# Patient Record
Sex: FEMALE | Race: BLACK OR AFRICAN AMERICAN | Employment: OTHER | URBAN - METROPOLITAN AREA
[De-identification: names, ages, dates, MRNs, and addresses within clinical notes are randomized per-mention and may not be internally consistent; named-entity substitution may affect disease eponyms.]

---

## 2018-12-19 ENCOUNTER — HOSPITAL ENCOUNTER (EMERGENCY)
Age: 48
Discharge: HOME | End: 2018-12-19
Attending: EMERGENCY MEDICINE
Payer: MEDICARE

## 2018-12-19 VITALS
DIASTOLIC BLOOD PRESSURE: 80 MMHG | BODY MASS INDEX: 27.31 KG/M2 | WEIGHT: 160 LBS | HEIGHT: 64 IN | SYSTOLIC BLOOD PRESSURE: 125 MMHG | HEART RATE: 76 BPM | RESPIRATION RATE: 14 BRPM | OXYGEN SATURATION: 98 % | TEMPERATURE: 98 F

## 2018-12-19 DIAGNOSIS — M25.562 CHRONIC PAIN OF BOTH KNEES: Primary | ICD-10-CM

## 2018-12-19 DIAGNOSIS — M25.561 CHRONIC PAIN OF BOTH KNEES: Primary | ICD-10-CM

## 2018-12-19 DIAGNOSIS — G89.29 CHRONIC PAIN OF BOTH KNEES: Primary | ICD-10-CM

## 2018-12-19 PROCEDURE — 99283 EMERGENCY DEPT VISIT LOW MDM: CPT

## 2018-12-19 ASSESSMENT — PAIN SCALES - GENERAL
PAINLEVEL_OUTOF10: 8
PAINLEVEL_OUTOF10: 8

## 2018-12-19 ASSESSMENT — PAIN DESCRIPTION - PAIN TYPE: TYPE: CHRONIC PAIN

## 2018-12-19 ASSESSMENT — PAIN DESCRIPTION - LOCATION: LOCATION: KNEE

## 2019-04-15 ENCOUNTER — OFFICE VISIT (OUTPATIENT)
Dept: ORTHOPEDIC SURGERY | Age: 49
End: 2019-04-15
Payer: MEDICARE

## 2019-04-15 VITALS
HEIGHT: 64 IN | WEIGHT: 160.05 LBS | HEART RATE: 68 BPM | BODY MASS INDEX: 27.33 KG/M2 | SYSTOLIC BLOOD PRESSURE: 128 MMHG | DIASTOLIC BLOOD PRESSURE: 68 MMHG

## 2019-04-15 DIAGNOSIS — M79.674 PAIN OF TOE OF RIGHT FOOT: ICD-10-CM

## 2019-04-15 DIAGNOSIS — M79.671 FOOT PAIN, RIGHT: Primary | ICD-10-CM

## 2019-04-15 DIAGNOSIS — S93.523A: ICD-10-CM

## 2019-04-15 PROCEDURE — L4361 PNEUMA/VAC WALK BOOT PRE OTS: HCPCS | Performed by: PODIATRIST

## 2019-04-15 PROCEDURE — 3017F COLORECTAL CA SCREEN DOC REV: CPT | Performed by: PODIATRIST

## 2019-04-15 PROCEDURE — 1036F TOBACCO NON-USER: CPT | Performed by: PODIATRIST

## 2019-04-15 PROCEDURE — G8427 DOCREV CUR MEDS BY ELIG CLIN: HCPCS | Performed by: PODIATRIST

## 2019-04-15 PROCEDURE — G8419 CALC BMI OUT NRM PARAM NOF/U: HCPCS | Performed by: PODIATRIST

## 2019-04-15 PROCEDURE — 99203 OFFICE O/P NEW LOW 30 MIN: CPT | Performed by: PODIATRIST

## 2019-04-15 NOTE — PROGRESS NOTES
HISTORY OF PRESENT ILLNESS: This is an initial visit for a 79-year-old female with a chief complaint of right foot pain. The patient twisted the  foot 7 days ago. She tripped over a parking stall block. Pain is present with weightbearing  and twisting motions of the foot. There is very little to no pain at the ankle. The pain is best relieved with rest, ice, and  elevation. FAMILY HISTORY: Documented in chart. SOCIAL HISTORY:  Documented in chart. REVIEW OF SYSTEMS: S/P MIS bunion surgery, otherwise  The patient denies any fever, chills, or night sweats. The patient also denies developing any type of rash. The patient denies any problems with cardiovascular, pulmonary, gastrointestinal, neurologic, urologic, genitourinary, psychiatric, dermatologic, and HEENT systems. PHYSICAL EXAMINATION: The area of greatest palpable tenderness is at the  He'll aspect of the right 1st MTP. There is while edema of the right forefoot. No open lesions or fracture blisters are present. She has palpable pedal pulses bilateral.  Sensation is grossly intact bilateral. There is no pain over the deltoid  ligament. There is no pain over the Achilles tendon and this is palpated to be  intact. Kelseys test is negative for a complete rupture of the Achilles  tendon. She has pain with any attempted range of motion at the 1st MTP. An anterior drawer test at the ankle is negative for any gross instability. The patient is able to dorsiflex and plantarflex the foot, as well as  nika and invert independently. RADIOGRAPHS: Three weightbearing x-ray views of the right foot were evaluated. No acute fractures or dislocations are noted. 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 This is also noted in her 2007 right foot film. ASSESSMENT: Right 1st MTP Sprain with foot pain. PLAN: The patient was educated on the pathology and its treatment options.      A high tide walker was applied to the patients right lower extremity. The patient is allowed to bear weight as  tolerated. Range of motion exercises were prescribed to the patient. Rest, ice,  and elevation are to be used to relieve pain. Overall activity is to be decreased  in the short-term. I will see her back if she continues having pain in about 4 weeks. Procedures    Airselect Tall Pneumatic Walking Boot     Patient was prescribed a Bevelyn [employer] Tall Walking Boot. The right foot will require stabilization / immobilization from this semi-rigid / rigid orthosis to improve their function. The orthosis will assist in protecting the affected area, provide functional support and facilitate healing. Patient was instructed to progress ambulation weight bearing as tolerated in the device. The patient was educated and fit by a healthcare professional with expert knowledge and specialization in brace application while under the direct supervision of the physician. Verbal and written instructions for the use of and application of this item were provided. They were instructed to contact the office immediately should the brace result in increased pain, decreased sensation, increased swelling or worsening of the condition.

## 2020-01-09 ENCOUNTER — HOSPITAL ENCOUNTER (EMERGENCY)
Age: 50
Discharge: HOME | End: 2020-01-09
Attending: EMERGENCY MEDICINE
Payer: MEDICARE

## 2020-01-09 VITALS
WEIGHT: 150 LBS | OXYGEN SATURATION: 98 % | HEIGHT: 64 IN | RESPIRATION RATE: 16 BRPM | BODY MASS INDEX: 25.61 KG/M2 | TEMPERATURE: 98.3 F | SYSTOLIC BLOOD PRESSURE: 109 MMHG | DIASTOLIC BLOOD PRESSURE: 73 MMHG | HEART RATE: 58 BPM

## 2020-01-09 LAB
ALBUMIN SERPL-MCNC: 4.4 G/DL (ref 3.4–5)
ALP BLD-CCNC: 89 U/L (ref 40–129)
ALT SERPL-CCNC: 15 U/L (ref 10–40)
ANION GAP SERPL CALCULATED.3IONS-SCNC: 11 MMOL/L (ref 3–16)
AST SERPL-CCNC: 28 U/L (ref 15–37)
BASOPHILS ABSOLUTE: 0 K/UL (ref 0–0.2)
BASOPHILS RELATIVE PERCENT: 0.7 %
BILIRUB SERPL-MCNC: <0.2 MG/DL (ref 0–1)
BILIRUBIN DIRECT: <0.2 MG/DL (ref 0–0.3)
BILIRUBIN URINE: NEGATIVE
BILIRUBIN, INDIRECT: NORMAL MG/DL (ref 0–1)
BLOOD, URINE: NEGATIVE
BUN BLDV-MCNC: 13 MG/DL (ref 7–20)
CALCIUM SERPL-MCNC: 9.4 MG/DL (ref 8.3–10.6)
CHLORIDE BLD-SCNC: 103 MMOL/L (ref 99–110)
CLARITY: CLEAR
CO2: 28 MMOL/L (ref 21–32)
COLOR: YELLOW
CREAT SERPL-MCNC: 1 MG/DL (ref 0.6–1.1)
EOSINOPHILS ABSOLUTE: 0.3 K/UL (ref 0–0.6)
EOSINOPHILS RELATIVE PERCENT: 5.5 %
EPITHELIAL CELLS, UA: ABNORMAL /HPF
GFR AFRICAN AMERICAN: >60
GFR NON-AFRICAN AMERICAN: 58
GLUCOSE BLD-MCNC: 77 MG/DL (ref 70–99)
GLUCOSE URINE: NEGATIVE MG/DL
HCT VFR BLD CALC: 37.9 % (ref 36–48)
HEMOGLOBIN: 12.1 G/DL (ref 12–16)
KETONES, URINE: NEGATIVE MG/DL
LEUKOCYTE ESTERASE, URINE: ABNORMAL
LIPASE: 41 U/L (ref 13–60)
LYMPHOCYTES ABSOLUTE: 1.5 K/UL (ref 1–5.1)
LYMPHOCYTES RELATIVE PERCENT: 26.6 %
MCH RBC QN AUTO: 27.5 PG (ref 26–34)
MCHC RBC AUTO-ENTMCNC: 31.9 G/DL (ref 31–36)
MCV RBC AUTO: 86 FL (ref 80–100)
MICROSCOPIC EXAMINATION: YES
MONOCYTES ABSOLUTE: 0.6 K/UL (ref 0–1.3)
MONOCYTES RELATIVE PERCENT: 10.4 %
NEUTROPHILS ABSOLUTE: 3.1 K/UL (ref 1.7–7.7)
NEUTROPHILS RELATIVE PERCENT: 56.8 %
NITRITE, URINE: NEGATIVE
PDW BLD-RTO: 14 % (ref 12.4–15.4)
PH UA: 5.5 (ref 5–8)
PLATELET # BLD: 366 K/UL (ref 135–450)
PMV BLD AUTO: 7 FL (ref 5–10.5)
POTASSIUM SERPL-SCNC: 4.1 MMOL/L (ref 3.5–5.1)
PROTEIN UA: NEGATIVE MG/DL
RBC # BLD: 4.41 M/UL (ref 4–5.2)
RBC UA: ABNORMAL /HPF (ref 0–2)
SODIUM BLD-SCNC: 142 MMOL/L (ref 136–145)
SPECIFIC GRAVITY UA: 1.02 (ref 1–1.03)
TOTAL PROTEIN: 7.2 G/DL (ref 6.4–8.2)
URINE TYPE: ABNORMAL
UROBILINOGEN, URINE: 0.2 E.U./DL
WBC # BLD: 5.5 K/UL (ref 4–11)
WBC UA: ABNORMAL /HPF (ref 0–5)

## 2020-01-09 PROCEDURE — 96374 THER/PROPH/DIAG INJ IV PUSH: CPT

## 2020-01-09 PROCEDURE — 96375 TX/PRO/DX INJ NEW DRUG ADDON: CPT

## 2020-01-09 PROCEDURE — 6360000002 HC RX W HCPCS: Performed by: EMERGENCY MEDICINE

## 2020-01-09 PROCEDURE — 80048 BASIC METABOLIC PNL TOTAL CA: CPT

## 2020-01-09 PROCEDURE — 85025 COMPLETE CBC W/AUTO DIFF WBC: CPT

## 2020-01-09 PROCEDURE — 81001 URINALYSIS AUTO W/SCOPE: CPT

## 2020-01-09 PROCEDURE — 99284 EMERGENCY DEPT VISIT MOD MDM: CPT

## 2020-01-09 PROCEDURE — 83690 ASSAY OF LIPASE: CPT

## 2020-01-09 PROCEDURE — 80076 HEPATIC FUNCTION PANEL: CPT

## 2020-01-09 RX ORDER — ONDANSETRON 4 MG/1
4 TABLET, ORALLY DISINTEGRATING ORAL EVERY 8 HOURS PRN
Qty: 20 TABLET | Refills: 0 | Status: SHIPPED | OUTPATIENT
Start: 2020-01-09 | End: 2020-08-29

## 2020-01-09 RX ORDER — KETOROLAC TROMETHAMINE 30 MG/ML
15 INJECTION, SOLUTION INTRAMUSCULAR; INTRAVENOUS ONCE
Status: COMPLETED | OUTPATIENT
Start: 2020-01-09 | End: 2020-01-09

## 2020-01-09 RX ORDER — ONDANSETRON 2 MG/ML
4 INJECTION INTRAMUSCULAR; INTRAVENOUS ONCE
Status: COMPLETED | OUTPATIENT
Start: 2020-01-09 | End: 2020-01-09

## 2020-01-09 RX ADMIN — ONDANSETRON 4 MG: 2 INJECTION INTRAMUSCULAR; INTRAVENOUS at 18:34

## 2020-01-09 RX ADMIN — KETOROLAC TROMETHAMINE 15 MG: 30 INJECTION, SOLUTION INTRAMUSCULAR at 18:34

## 2020-01-09 ASSESSMENT — PAIN DESCRIPTION - LOCATION: LOCATION: ABDOMEN

## 2020-01-09 ASSESSMENT — PAIN SCALES - GENERAL: PAINLEVEL_OUTOF10: 8

## 2020-01-09 ASSESSMENT — PAIN DESCRIPTION - ORIENTATION: ORIENTATION: LOWER

## 2020-01-09 ASSESSMENT — PAIN DESCRIPTION - FREQUENCY: FREQUENCY: CONTINUOUS

## 2020-01-09 ASSESSMENT — PAIN DESCRIPTION - DESCRIPTORS: DESCRIPTORS: CRAMPING

## 2020-01-09 ASSESSMENT — PAIN DESCRIPTION - PAIN TYPE: TYPE: ACUTE PAIN
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
## 2020-04-28 ENCOUNTER — OFFICE VISIT (OUTPATIENT)
Dept: PRIMARY CARE CLINIC | Age: 50
End: 2020-04-28
Payer: MEDICARE

## 2020-04-28 VITALS — HEART RATE: 63 BPM | TEMPERATURE: 98.2 F | OXYGEN SATURATION: 98 %

## 2020-04-28 PROCEDURE — 99212 OFFICE O/P EST SF 10 MIN: CPT | Performed by: NURSE PRACTITIONER

## 2020-04-28 PROCEDURE — G8419 CALC BMI OUT NRM PARAM NOF/U: HCPCS | Performed by: NURSE PRACTITIONER

## 2020-04-28 PROCEDURE — 3017F COLORECTAL CA SCREEN DOC REV: CPT | Performed by: NURSE PRACTITIONER

## 2020-04-28 PROCEDURE — 1036F TOBACCO NON-USER: CPT | Performed by: NURSE PRACTITIONER

## 2020-04-28 PROCEDURE — G8428 CUR MEDS NOT DOCUMENT: HCPCS | Performed by: NURSE PRACTITIONER

## 2020-04-28 NOTE — PATIENT INSTRUCTIONS
Advance Care Planning  People with COVID-19 may have no symptoms, mild symptoms, such as fever, cough, and shortness of breath or they may have more severe illness, developing severe and fatal pneumonia. As a result, Advance Care Planning with attention to naming a health care decision maker (someone you trust to make healthcare decisions for you if you could not speak for yourself) and sharing other health care preferences is important BEFORE a possible health crisis. Please contact your Primary Care Provider to discuss Advance Care Planning. Preventing the Spread of Coronavirus Disease 2019 in Homes and Residential Communities  For the most recent information go to [email or web address]    Prevention steps for People with confirmed or suspected COVID-19 (including persons under investigation) who do not need to be hospitalized  and   People with confirmed COVID-19 who were hospitalized and determined to be medically stable to go home    Your healthcare provider and public health staff will evaluate whether you can be cared for at home. If it is determined that you do not need to be hospitalized and can be isolated at home, you will be monitored by staff from your local or state health department. You should follow the prevention steps below until a healthcare provider or local or state health department says you can return to your normal activities. Stay home except to get medical care  People who are mildly ill with COVID-19 are able to isolate at home during their illness. You should restrict activities outside your home, except for getting medical care. Do not go to work, school, or public areas. Avoid using public transportation, ride-sharing, or taxis. Separate yourself from other people and animals in your home  People: As much as possible, you should stay in a specific room and away from other people in your home.  Also, you should use a separate before eating or preparing food. If soap and water are not readily available, use an alcohol-based hand  with at least 60% alcohol, covering all surfaces of your hands and rubbing them together until they feel dry. Soap and water are the best option if hands are visibly dirty. Avoid touching your eyes, nose, and mouth with unwashed hands. Avoid sharing personal household items  You should not share dishes, drinking glasses, cups, eating utensils, towels, or bedding with other people or pets in your home. After using these items, they should be washed thoroughly with soap and water. Clean all high-touch surfaces everyday  High touch surfaces include counters, tabletops, doorknobs, bathroom fixtures, toilets, phones, keyboards, tablets, and bedside tables. Also, clean any surfaces that may have blood, stool, or body fluids on them. Use a household cleaning spray or wipe, according to the label instructions. Labels contain instructions for safe and effective use of the cleaning product including precautions you should take when applying the product, such as wearing gloves and making sure you have good ventilation during use of the product. Monitor your symptoms  Seek prompt medical attention if your illness is worsening (e.g., difficulty breathing). Before seeking care, call your healthcare provider and tell them that you have, or are being evaluated for, COVID-19. Put on a facemask before you enter the facility. These steps will help the healthcare providers office to keep other people in the office or waiting room from getting infected or exposed. Ask your healthcare provider to call the local or state health department. Persons who are placed under active monitoring or facilitated self-monitoring should follow instructions provided by their local health department or occupational health professionals, as appropriate. When working with your local health department check their available hours.   If you

## 2020-04-30 ENCOUNTER — TELEPHONE (OUTPATIENT)
Dept: PRIMARY CARE CLINIC | Age: 50
End: 2020-04-30

## 2020-04-30 NOTE — TELEPHONE ENCOUNTER
Called patient to follow up from flu clinic visit and left voicemail for loop, advising them to reach out to pcp for follow up visit 7-14 after being seen here and if they dont have pcp to reach out to the primary care hot line at [phone]

## 2020-05-01 LAB
SARS-COV-2: NOT DETECTED
SOURCE: NORMAL

## 2020-07-01 ENCOUNTER — NURSE ONLY (OUTPATIENT)
Dept: PRIMARY CARE CLINIC | Age: 50
End: 2020-07-01
Payer: MEDICARE

## 2020-07-01 PROCEDURE — 99211 OFF/OP EST MAY X REQ PHY/QHP: CPT | Performed by: NURSE PRACTITIONER

## 2020-07-01 NOTE — PROGRESS NOTES
Shen Barrera received a viral test for COVID-19. They were educated on isolation and quarantine as appropriate. For any symptoms, they were directed to seek care from their PCP, given contact information to establish with a doctor, directed to an urgent care or the emergency room.

## 2020-07-04 LAB
SARS-COV-2: NOT DETECTED
SOURCE: NORMAL

## 2020-08-29 ENCOUNTER — APPOINTMENT (OUTPATIENT)
Dept: GENERAL RADIOLOGY | Age: 50
End: 2020-08-29
Payer: MEDICARE

## 2020-08-29 ENCOUNTER — HOSPITAL ENCOUNTER (EMERGENCY)
Age: 50
Discharge: HOME | End: 2020-08-29
Attending: STUDENT IN AN ORGANIZED HEALTH CARE EDUCATION/TRAINING PROGRAM
Payer: MEDICARE

## 2020-08-29 VITALS
OXYGEN SATURATION: 97 % | DIASTOLIC BLOOD PRESSURE: 76 MMHG | SYSTOLIC BLOOD PRESSURE: 130 MMHG | TEMPERATURE: 98.9 F | HEART RATE: 72 BPM | RESPIRATION RATE: 18 BRPM

## 2020-08-29 PROCEDURE — 73590 X-RAY EXAM OF LOWER LEG: CPT

## 2020-08-29 PROCEDURE — 6370000000 HC RX 637 (ALT 250 FOR IP): Performed by: STUDENT IN AN ORGANIZED HEALTH CARE EDUCATION/TRAINING PROGRAM

## 2020-08-29 PROCEDURE — 73630 X-RAY EXAM OF FOOT: CPT

## 2020-08-29 PROCEDURE — 73610 X-RAY EXAM OF ANKLE: CPT

## 2020-08-29 PROCEDURE — 99283 EMERGENCY DEPT VISIT LOW MDM: CPT

## 2020-08-29 RX ORDER — IBUPROFEN 400 MG/1
800 TABLET ORAL ONCE
Status: COMPLETED | OUTPATIENT
Start: 2020-08-29 | End: 2020-08-29

## 2020-08-29 RX ORDER — HYDROCODONE BITARTRATE AND ACETAMINOPHEN 5; 325 MG/1; MG/1
1 TABLET ORAL ONCE
Status: COMPLETED | OUTPATIENT
Start: 2020-08-29 | End: 2020-08-29

## 2020-08-29 RX ADMIN — HYDROCODONE BITARTRATE AND ACETAMINOPHEN 1 TABLET: 5; 325 TABLET ORAL at 18:01

## 2020-08-29 RX ADMIN — IBUPROFEN 800 MG: 400 TABLET, FILM COATED ORAL at 18:37

## 2020-08-29 ASSESSMENT — PAIN DESCRIPTION - FREQUENCY: FREQUENCY: CONTINUOUS

## 2020-08-29 ASSESSMENT — ENCOUNTER SYMPTOMS
ABDOMINAL PAIN: 0
TROUBLE SWALLOWING: 0
SHORTNESS OF BREATH: 0
VOMITING: 0
DIARRHEA: 0
NAUSEA: 0
SORE THROAT: 0
BLOOD IN STOOL: 0
COUGH: 0

## 2020-08-29 ASSESSMENT — PAIN DESCRIPTION - ORIENTATION: ORIENTATION: LEFT

## 2020-08-29 ASSESSMENT — PAIN DESCRIPTION - ONSET: ONSET: ON-GOING

## 2020-08-29 ASSESSMENT — PAIN DESCRIPTION - DESCRIPTORS: DESCRIPTORS: THROBBING

## 2020-08-29 ASSESSMENT — PAIN DESCRIPTION - LOCATION: LOCATION: FOOT

## 2020-08-29 ASSESSMENT — PAIN SCALES - GENERAL
PAINLEVEL_OUTOF10: 10

## 2020-08-29 NOTE — ED PROVIDER NOTES
ED Attending Attestation Note     Date of evaluation: 8/29/2020    This patient was seen by the resident. I have seen and examined the patient, agree with the workup, evaluation, management and diagnosis. The care plan has been discussed. My assessment reveals a 50-year-old female who presents with left ankle pain after eversion of this ankle while walking. Patient continued to ambulate on left ankle but with pain prompting ED presentation. Patient noted to have pain and tenderness to left lateral malleolus with tenderness of the left fifth metatarsal as well, motor, sensory, and cap refill intact distally x-ray reveals lateral malleoli are fracture without significant displacement. Patient placed in walking boot and provided with ibuprofen for pain relief which she states helped. We will plan for discharge home with outpatient orthopedic surgery follow-up.      Daquan Hale MD  08/29/20 7345

## 2020-08-29 NOTE — ED TRIAGE NOTES
Pt was walking a crossed the yard and stepped into a rock causing her to fall and twist her ankle. Pt states this happened about 3 hours ago. Pt attempted to go to work but was unable to continue walking because of the pain.

## 2020-08-29 NOTE — ED NOTES
Patient prepared for and ready to be discharged. Patient discharged at this time in no acute distress after verbalizing understanding of discharge instructions. Patient left after receiving After Visit Summary instructions.         Carlos Watters RN  08/29/20 [id number]

## 2020-08-29 NOTE — ED PROVIDER NOTES
[address] ENCOUNTER          EM RESIDENT NOTE       Date of evaluation: 8/29/2020    Chief Complaint     Foot Injury (left)      History of Present Illness     Georgi Dawson is a 45 y.o. female who presents with left foot injury. She was distracted and twisted her ankle stepping off a curb. It was an eversion injury. She was able to walk on it but was at work at [employer] and the pain was getting worse. It was also getting more swollen. She took some tylenol with limited improvement. Review of Systems     Review of Systems   Constitutional: Negative for activity change, appetite change and fever. HENT: Negative for congestion, sore throat and trouble swallowing. Eyes: Negative for visual disturbance. Respiratory: Negative for cough and shortness of breath. Cardiovascular: Negative for chest pain. Gastrointestinal: Negative for abdominal pain, blood in stool, diarrhea, nausea and vomiting. Genitourinary: Negative for dysuria, flank pain and hematuria. Musculoskeletal: Positive for joint swelling. Negative for arthralgias and myalgias. Skin: Negative for rash. Neurological: Negative for speech difficulty, weakness, numbness and headaches. Psychiatric/Behavioral: Negative for confusion and sleep disturbance. Past Medical, Surgical, Family, and Social History     She has a past medical history of Depression and Potential exposure to STD. She has a past surgical history that includes Abdomen surgery; fracture surgery; and knee surgery. Her family history is not on file. She reports that she has never smoked. She has never used smokeless tobacco. She reports that she does not drink alcohol or use drugs. Medications     Previous Medications    No medications on file       Allergies     She has No Known Allergies.     Physical Exam     INITIAL VITALS:  , Temp: 98.9 °F (37.2 °C), Pulse: 72, Resp: 18, SpO2: 97 %   Physical Exam  Constitutional: General: She is not in acute distress. Appearance: She is well-developed. HENT:      Head: Normocephalic and atraumatic. Nose: Nose normal.      Mouth/Throat:      Mouth: Mucous membranes are moist.      Pharynx: Oropharynx is clear. Eyes:      Conjunctiva/sclera: Conjunctivae normal.      Pupils: Pupils are equal, round, and reactive to light. Neck:      Musculoskeletal: Normal range of motion. No neck rigidity. Cardiovascular:      Rate and Rhythm: Normal rate and regular rhythm. Heart sounds: Normal heart sounds. Pulmonary:      Effort: Pulmonary effort is normal. No respiratory distress. Breath sounds: Normal breath sounds. Abdominal:      General: Abdomen is flat. There is no distension. Palpations: Abdomen is soft. There is no mass. Tenderness: There is no abdominal tenderness. There is no guarding. Musculoskeletal: Normal range of motion. General: Swelling and tenderness present. No deformity. Comments: Mild swelling along the left lateral malleolus with tenderness to palpation along the left posterior lateral mallelous. No tenderness in the foot. Normal ROM of the ankle with some tenderness. Normal ROM of the knee. No tenderness along the fibula. 2+ DP pulse. Lymphadenopathy:      Cervical: No cervical adenopathy. Skin:     General: Skin is warm. Findings: No rash. Neurological:      General: No focal deficit present. Mental Status: She is alert and oriented to person, place, and time. Mental status is at baseline. Cranial Nerves: No cranial nerve deficit. Sensory: No sensory deficit. Motor: No weakness.       Coordination: Coordination normal.   Psychiatric:         Mood and Affect: Mood normal.         Behavior: Behavior normal.         DiagnosticResults       RADIOLOGY:  XR ANKLE LEFT (MIN 3 VIEWS)   Final Result      Acute, nondisplaced lateral malleolus fracture, Shah A type      XR TIBIA FIBULA LEFT (2 VIEWS) Final Result      No additional fracture      See ankle series report for lateral malleolus fracture      XR FOOT LEFT (MIN 3 VIEWS)    (Results Pending)       LABS:   No results found for this visit on 08/29/20. ED BEDSIDE ULTRASOUND:      RECENT VITALS:   , Temp: 98.9 °F (37.2 °C), Pulse: 72,Resp: 18, SpO2: 97 %     Procedures         ED Course     Nursing Notes, Past Medical Hx, Past Surgical Hx, Social Hx, Allergies, and Family Hx were reviewed. The patient was given the followingmedications:  Orders Placed This Encounter   Medications    HYDROcodone-acetaminophen (NORCO) 5-325 MG per tablet 1 tablet       CONSULTS:  None    MEDICAL DECISION MAKING / ASSESSMENT / Jonatan Florence is a 45 y.o. female who presents with left ankle pain. On presentation she was in no acute distress and hemodynamically stable. X-rays of the left ankle and tib/fib with acute fracture of the lateral malleolus, non displaced. She was placed in walking boot, given crutches, and discharged with ortho follow up. This patient was also evaluated by the attending physician. All care plans werediscussed and agreed upon. Clinical Impression     1.  Closed nondisplaced fracture of lateral malleolus of left fibula, initial encounter        Disposition     PATIENT REFERRED TO:  Agustin Mtz MD  [facility] [address] [phone]            DISCHARGE MEDICATIONS:  New Prescriptions    No medications on file       DISPOSITION Decision To Discharge 08/29/2020 05:49:00 PM       Reina Moran MD  Resident  08/29/20 7562       Reina Moran MD  Resident  08/29/20 1800

## 2020-09-18 ENCOUNTER — HOSPITAL ENCOUNTER (EMERGENCY)
Age: 50
Discharge: HOME | End: 2020-09-18
Payer: MEDICARE

## 2020-09-18 ENCOUNTER — APPOINTMENT (OUTPATIENT)
Dept: GENERAL RADIOLOGY | Age: 50
End: 2020-09-18
Payer: MEDICARE

## 2020-09-18 VITALS
HEIGHT: 64 IN | OXYGEN SATURATION: 100 % | RESPIRATION RATE: 18 BRPM | BODY MASS INDEX: 25.61 KG/M2 | TEMPERATURE: 97.6 F | HEART RATE: 62 BPM | DIASTOLIC BLOOD PRESSURE: 70 MMHG | WEIGHT: 150 LBS | SYSTOLIC BLOOD PRESSURE: 110 MMHG

## 2020-09-18 PROCEDURE — 99283 EMERGENCY DEPT VISIT LOW MDM: CPT

## 2020-09-18 PROCEDURE — 96372 THER/PROPH/DIAG INJ SC/IM: CPT

## 2020-09-18 PROCEDURE — 6360000002 HC RX W HCPCS: Performed by: PHYSICIAN ASSISTANT

## 2020-09-18 PROCEDURE — 73610 X-RAY EXAM OF ANKLE: CPT

## 2020-09-18 RX ORDER — HYDROCODONE BITARTRATE AND ACETAMINOPHEN 5; 325 MG/1; MG/1
1 TABLET ORAL EVERY 6 HOURS PRN
Qty: 10 TABLET | Refills: 0 | Status: SHIPPED | OUTPATIENT
Start: 2020-09-18 | End: 2020-09-21

## 2020-09-18 RX ORDER — IBUPROFEN 600 MG/1
600 TABLET ORAL EVERY 6 HOURS PRN
Qty: 30 TABLET | Refills: 0 | OUTPATIENT
Start: 2020-09-18 | End: 2021-04-16

## 2020-09-18 RX ORDER — KETOROLAC TROMETHAMINE 30 MG/ML
15 INJECTION, SOLUTION INTRAMUSCULAR; INTRAVENOUS ONCE
Status: COMPLETED | OUTPATIENT
Start: 2020-09-18 | End: 2020-09-18

## 2020-09-18 RX ADMIN — KETOROLAC TROMETHAMINE 15 MG: 30 INJECTION, SOLUTION INTRAMUSCULAR at 21:24

## 2020-09-18 ASSESSMENT — ENCOUNTER SYMPTOMS
SHORTNESS OF BREATH: 0
ABDOMINAL PAIN: 0
VOMITING: 0
SORE THROAT: 0
FACIAL SWELLING: 0
BACK PAIN: 0
CHEST TIGHTNESS: 0
COUGH: 0
NAUSEA: 0

## 2020-09-18 ASSESSMENT — PAIN SCALES - GENERAL
PAINLEVEL_OUTOF10: 8
PAINLEVEL_OUTOF10: 1

## 2020-09-18 ASSESSMENT — PAIN DESCRIPTION - ONSET: ONSET: ON-GOING

## 2020-09-18 ASSESSMENT — PAIN DESCRIPTION - PAIN TYPE: TYPE: ACUTE PAIN;CHRONIC PAIN

## 2020-09-18 ASSESSMENT — PAIN DESCRIPTION - FREQUENCY: FREQUENCY: INTERMITTENT

## 2020-09-18 ASSESSMENT — PAIN DESCRIPTION - DESCRIPTORS: DESCRIPTORS: DISCOMFORT;SORE

## 2020-09-18 ASSESSMENT — PAIN DESCRIPTION - LOCATION: LOCATION: ANKLE

## 2020-09-18 ASSESSMENT — PAIN DESCRIPTION - ORIENTATION: ORIENTATION: RIGHT

## 2020-09-19 NOTE — ED NOTES
.Patient prepared for and ready to be discharged. Patient discharged at this time in no acute distress after verbalizing understanding of discharge instructions. Patient left after receiving After Visit Summary instructions.       Criss Nagy RN  09/18/20 [id number]

## 2020-09-19 NOTE — PROGRESS NOTES
Musculoskeletal:  Muscle strength is 5/5 at all lower extremity muscle groups bilateral   Biomechanical:  Biomechanics:   Gait Analysis:  Angle of Gait    R: 7 L: 7 (7-10 deg abducted)     Base of Gait (centimeters)  3.0cm     Stance Phase (any abnormal findings): No abnormal findings     Swing Phase (any abnormal findings): Antalgic gait left lower extremity     Postural Considerations (limb length/assymetry): No limb length discrepancy     Ankle Joint: Dorsiflexion (KE):  R: 0   L:  0     Dorsiflexion (KF):  R: 0 L:  0     Subtalar: Inversion:   R:  20  L:  20     Eversion:   R:  10  L:  10     Neutral Position ((inv+ev)/3): R:  8 L:  8 (if > eversion it's its inverted, if less it's everted)     Midtarsal: 1-5 Position:   R: 0 L: 0      First Ray: Dorsiflexion:   R:5mm L: 5mm     Plantarflexion:   R:5mm L: 5mm     First MPJ:  Dorsiflexion (unloaded): R: 65 L: 65     Dorsiflexion (loaded):  R: 55 L: 55     Static Stance: RCSP (calc bisection): R: 1 degree valgus L: 1 degree valgus     NCSP: (STJNP + TI)  R: Neutral L: Neutral     Tibial Influence (Leg to grnd): R: 0 L: 0      Patient had minimally displaced left malleoli transverse fracture. Posterior splint was applied to left lower extremity and patient will be nonweightbearing.   Patient would benefit from close follow-up with podiatrist.

## 2020-09-19 NOTE — CONSULTS
Department of Podiatry Consult Note  Resident       Reason for Consult: Ankle fracture left     Requesting Physician:  ROSA Rojo    CHIEF COMPLAINT: Left ankle pain    HISTORY OF PRESENT ILLNESS:                The patient is a 45 y.o. female with significant past medical history of please see the list below. Podiatry was consulted for a left ankle fracture. Patient relates that she forgot there were steps to her apartment and twisted her right ankle. Patient was last seen at the [facility] ED on 8/28/2024 closed nondisplaced fracture of the lateral malleolus left fibular and was given a CAM boot and to follow-up with an orthopedic surgeon. Patient never did follow-up with the orthopedic surgeon. Patient reported today that she was feeling good the last two weeks, but the pain to her left ankle has gradually increased and is now up to a 10/10 on the pain scale. She describes the pain as sharp in nature. Patient has taken over-the-counter medications, but too little or no relief. Patient denies nausea, vomiting, fever, chills, shortness of breath, or other constitutional symptoms. Past Medical History:        Diagnosis Date    Ankle injury     left    Depression     Potential exposure to STD      Past Surgical History:        Procedure Laterality Date    ABDOMEN SURGERY      FRACTURE SURGERY      KNEE SURGERY       Current Medications:    No current facility-administered medications for this encounter. Allergies:   Patient has no known allergies. Social History:    TOBACCO:   reports that she has never smoked. She has never used smokeless tobacco.  Family History:   History reviewed. No pertinent family history. REVIEW OF SYSTEMS:  A 10 point review of systems was conducted, significant findings as noted in HPI. All other systems negative.     PHYSICAL EXAM:      Vitals:    /70   Pulse 62   Temp 97.6 °F (36.4 °C) (Oral)   Resp 18   Ht 5' 4\" (1.626 m)   Wt 150 lb (68 kg)   LMP 01/01/2018   SpO2 100%   Breastfeeding No   BMI 25.75 kg/m²     LABS:   No results for input(s): WBC, HGB, HCT, PLT in the last 72 hours. No results for input(s): NA, K, CL, CO2, PHOS, BUN, CREATININE in the last 72 hours. Invalid input(s): CA  No results for input(s): PROT, INR, APTT in the last 72 hours. VASCULAR: DP and PT pulses are palpable  b/l. CFT is brisk to the digits of the foot b/l. Skin temperature is warm to cool from proximal to distal with no focal calor noted. No edema noted. No pain with calf compression b/l. NEUROLOGIC: Gross and epicritic sensation is intact b/l. Protective sensation is appreciated at all pedal sites b/l. DERMATOLOGIC: Nails 1-5 b/l are within normal limits of length, thickness, and color. Webspaces 1-4 b/l are clean, dry, and intact. No hyperkeratosis noted. No open wounds noted. No subcutaneous nodules, rashes, or other skin lesions noted. MUSCULOSKELETAL: Muscle strength is 5/5 for all pedal groups tested. Pain with palpation lateral malleolus and CFL LLE. Ankle joint ROM is decreased in dorsiflexion with the knee extended. No obvious biomechanical abnormalities. Ankle is stable to testing  Negative anterior drawer. Negative talar tilt. Negative external rotation test/C[place]. No peroneal tendon pain or subluxation appreciated. No crepitation or restriction with ankle ROM.  +POP at the lateral malleolus and CFL. IMAGING:  Left ankle 9/18/2020  Impression    Impression:    Lateral malleolus fracture         IMPRESSION/RECOMMENDATIONS:      Minimally displaced lateral malleolar fracture; left ankle 2/2 mechanical fall  Ankle pain; left      -Patient was seen and examined this p.m.  -VSS, afebrile, no labs taken.   -X-rays left ankle; lateral malleolus fracture; unchanged since last x-ray taken on 8/28/2020.  -Left lower extremity dressed with cast padding and posterior splint.  -Instructed patient to be nonweightbearing to the left lower extremity and aid with crutches.   -Instructed patient to leave dressing clean, dry, and intact.  -Instructed patient to ice, and elevate left lower extremity at all times while laying or sitting.  -Instruct patient to follow-up with Dr. Cindy Brito for close management of left ankle fracture. DISPO: Left ankle lateral malleolar fracture stable and no emergent need for surgical intervention. Patient will need close follow-up. Patient is okay for discharge from podiatry standpoint. Instructed patient to follow-up with Dr. Cindy Brito. - Will sign off on the patient. If any other pedal problems occur please contact us. Thank you for the opportunity to take part in the patient's care.      - Discussed assessment and plan with ADELSO MartinM   Podiatric Resident, PGY-2  Pager: [phone] or Frances serve

## 2020-09-19 NOTE — ED PROVIDER NOTES
[address] [facility] [address] ENCOUNTER          PHYSICIAN ASSISTANT NOTE       Date of evaluation: 9/18/2020    Chief Complaint     Ankle Pain (seen here for ankle injury about 3 weeks ago, did not get ibuprofen rx filled, did not follow up with Ortho, job wants her to come back to work but still having pain)      History of Present Illness     Bonita Alicia is a 45 y.o. female who presents with left lateral ankle pain. Patient was seen and evaluated on August 29 and diagnosed with a left lateral malleolus fracture that was nondisplaced. She was placed in a walking boot and told to follow-up with orthopedics. She has never followed up with orthopedics. She would like to go back to work but needs a note to go back to work and is still using her walking boot. She does still complain of pain and swelling to the left lateral malleolus. Denies any new injury or trauma. Denies any radiation of pain, paresthesias, weakness of the left lower extremity. Denies any foot pain or knee pain. She would like something for pain today. She states the pain is a throbbing aching pain worse with palpation or movement. She has not had anything for pain and did not get her prescription filled. Review of Systems     Review of Systems   Constitutional: Negative for chills, fatigue and fever. HENT: Negative for congestion, facial swelling and sore throat. Respiratory: Negative for cough, chest tightness and shortness of breath. Cardiovascular: Negative for chest pain, palpitations and leg swelling. Gastrointestinal: Negative for abdominal pain, nausea and vomiting. Genitourinary: Negative for flank pain, hematuria and pelvic pain. Musculoskeletal: Negative for back pain and neck pain. Neurological: Negative for dizziness, syncope, weakness, light-headedness, numbness and headaches.        Past Medical, Surgical, Family, and Social History     She has a past medical history of Ankle injury, Depression, and Potential exposure to STD. She has a past surgical history that includes Abdomen surgery; fracture surgery; and knee surgery. Her family history is not on file. She reports that she has never smoked. She has never used smokeless tobacco. She reports that she does not drink alcohol or use drugs. Medications     Previous Medications    No medications on file       Allergies     She has No Known Allergies. Physical Exam     INITIAL VITALS: BP: 110/70, Temp: 97.6 °F (36.4 °C), Pulse: 62, Resp: 18, SpO2: 100 %  Physical Exam  Vitals signs and nursing note reviewed. Constitutional:       General: She is not in acute distress. Appearance: Normal appearance. She is not ill-appearing. HENT:      Head: Normocephalic and atraumatic. Neck:      Musculoskeletal: Normal range of motion. Cardiovascular:      Rate and Rhythm: Normal rate and regular rhythm. Pulses: Normal pulses. Heart sounds: Normal heart sounds. Pulmonary:      Effort: Pulmonary effort is normal.      Breath sounds: Normal breath sounds. Musculoskeletal:         General: Swelling and tenderness present. Left ankle: She exhibits decreased range of motion and swelling. She exhibits no ecchymosis, no deformity, no laceration and normal pulse. Tenderness. Lateral malleolus and AITFL tenderness found. No medial malleolus, no CF ligament, no posterior TFL and no head of 5th metatarsal tenderness found. Achilles tendon normal. Achilles tendon exhibits no pain and no defect. Comments: Soft tissue swelling and tenderness over the left lateral malleolus. Achilles tendon is intact. No medial malleoli or tenderness noted. No tenderness of the left foot or left lateral foot. No tenderness of the proximal fibula. Patient does have some decreased range of motion of left ankle. 2+ dorsalis pedis and posterior tibial pulse left lower extremity. No erythema or warmth to touch of the ankle. No calf tenderness. Skin:     General: Skin is warm and dry. Neurological:      General: No focal deficit present. Mental Status: She is alert. Sensory: No sensory deficit. Motor: No weakness. Comments: 5 out of 5 strength lower extremities equal bilaterally         Diagnostic Results     RADIOLOGY:  XR ANKLE LEFT (MIN 3 VIEWS)   Final Result   Impression:    Lateral malleolus fracture. RECENT VITALS:  BP: 110/70, Temp: 97.6 °F (36.4 °C), Pulse: 62, Resp: 18, SpO2: 100 %     Procedures       ED Course     Nursing Notes, Past Medical Hx,Past Surgical Hx, Social Hx, Allergies, and Family Hx were reviewed. The patient was given the following medications:  Orders Placed This Encounter   Medications    ketorolac (TORADOL) injection 15 mg    HYDROcodone-acetaminophen (NORCO) 5-325 MG per tablet     Sig: Take 1 tablet by mouth every 6 hours as needed for Pain for up to 3 days. Dispense:  10 tablet     Refill:  0    ibuprofen (ADVIL;MOTRIN) 600 MG tablet     Sig: Take 1 tablet by mouth every 6 hours as needed for Pain     Dispense:  30 tablet     Refill:  0       CONSULTS:  IP CONSULT TO [address] / ASSESSMENT / Richmond Plan is a 45 y.o. female presents here with continuing pain to the left lateral ankle. Patient was seen on August 29 and diagnosed with left lateral malleolar fracture. She was placed in a walking boot but never had follow-up. She continues to have pain to the left lateral ankle and wants to return to work. She is neurovascular intact left lower extremity. No new injury. Left ankle x-ray today was done which showed lateral malleolar fracture with 2 mm displacement with soft tissue swelling. I did speak to podiatry who evaluated the patient in the emergency department and placed a posterior splint. She was given crutches and is to be nonweightbearing on the left lower extremity. She is to follow-up with Dr. Atif Bowie next week.   Rest ice elevate. Wear splint until follow-up with podiatry. Use crutches until follow-up. Patient was prescribed a few Norco as well as ibuprofen for pain. If increased pain, numbness, weakness or worsening symptoms she can return the emergency department. Patient stable for discharge. This patient was also evaluated by the attending physician. All care plans were discussed and agreed upon. Clinical Impression     1. Closed fracture of left ankle with routine healing, subsequent encounter        Disposition     PATIENT REFERRED TO:  [facility]  [address] [facility]    If symptoms worsen    Ceferino Rodriguez, DPM  [address] [facility] [address]  [phone]    Schedule an appointment as soon as possible for a visit in 3 days  call Monday to be seen next week      DISCHARGE MEDICATIONS:  New Prescriptions    HYDROCODONE-ACETAMINOPHEN (NORCO) 5-325 MG PER TABLET    Take 1 tablet by mouth every 6 hours as needed for Pain for up to 3 days.     IBUPROFEN (ADVIL;MOTRIN) 600 MG TABLET    Take 1 tablet by mouth every 6 hours as needed for Pain       DISPOSITION  discharged        Khalida Meneses  09/18/20 1292

## 2020-10-03 ENCOUNTER — HOSPITAL ENCOUNTER (EMERGENCY)
Age: 50
Discharge: HOME | End: 2020-10-03
Attending: STUDENT IN AN ORGANIZED HEALTH CARE EDUCATION/TRAINING PROGRAM
Payer: MEDICARE

## 2020-10-03 VITALS
HEART RATE: 85 BPM | DIASTOLIC BLOOD PRESSURE: 70 MMHG | OXYGEN SATURATION: 97 % | WEIGHT: 165 LBS | SYSTOLIC BLOOD PRESSURE: 121 MMHG | RESPIRATION RATE: 18 BRPM | HEIGHT: 63 IN | TEMPERATURE: 101.3 F | BODY MASS INDEX: 29.23 KG/M2

## 2020-10-03 PROCEDURE — 6370000000 HC RX 637 (ALT 250 FOR IP): Performed by: STUDENT IN AN ORGANIZED HEALTH CARE EDUCATION/TRAINING PROGRAM

## 2020-10-03 PROCEDURE — U0003 INFECTIOUS AGENT DETECTION BY NUCLEIC ACID (DNA OR RNA); SEVERE ACUTE RESPIRATORY SYNDROME CORONAVIRUS 2 (SARS-COV-2) (CORONAVIRUS DISEASE [COVID-19]), AMPLIFIED PROBE TECHNIQUE, MAKING USE OF HIGH THROUGHPUT TECHNOLOGIES AS DESCRIBED BY CMS-2020-01-R: HCPCS

## 2020-10-03 PROCEDURE — 99283 EMERGENCY DEPT VISIT LOW MDM: CPT

## 2020-10-03 PROCEDURE — 96372 THER/PROPH/DIAG INJ SC/IM: CPT

## 2020-10-03 PROCEDURE — U0002 COVID-19 LAB TEST NON-CDC: HCPCS

## 2020-10-03 PROCEDURE — 6360000002 HC RX W HCPCS: Performed by: STUDENT IN AN ORGANIZED HEALTH CARE EDUCATION/TRAINING PROGRAM

## 2020-10-03 RX ORDER — KETOROLAC TROMETHAMINE 30 MG/ML
15 INJECTION, SOLUTION INTRAMUSCULAR; INTRAVENOUS ONCE
Status: COMPLETED | OUTPATIENT
Start: 2020-10-03 | End: 2020-10-03

## 2020-10-03 RX ORDER — ACETAMINOPHEN 325 MG/1
650 TABLET ORAL ONCE
Status: COMPLETED | OUTPATIENT
Start: 2020-10-03 | End: 2020-10-03

## 2020-10-03 RX ADMIN — KETOROLAC TROMETHAMINE 15 MG: 30 INJECTION, SOLUTION INTRAMUSCULAR at 17:06

## 2020-10-03 RX ADMIN — ACETAMINOPHEN 650 MG: 325 TABLET ORAL at 16:11

## 2020-10-03 ASSESSMENT — ENCOUNTER SYMPTOMS
ABDOMINAL PAIN: 0
COLOR CHANGE: 0
COUGH: 0
EYE REDNESS: 0
SORE THROAT: 0
VOMITING: 0
NAUSEA: 1
SHORTNESS OF BREATH: 0
RHINORRHEA: 0
EYE DISCHARGE: 0

## 2020-10-03 ASSESSMENT — PAIN SCALES - GENERAL
PAINLEVEL_OUTOF10: 8
PAINLEVEL_OUTOF10: 8

## 2020-10-03 NOTE — ED NOTES
Patient prepared for and ready to be discharged. Patient discharged at this time in no acute distress after verbalizing understanding of discharge instructions. Patient left after receiving After Visit Summary instructions.         Yoel Becker RN  10/03/20 2040

## 2020-10-03 NOTE — ED PROVIDER NOTES
[facility]          ATTENDING PHYSICIAN NOTE       Date of evaluation: 10/3/2020    Chief Complaint     Headache and Generalized Body Aches      History of Present Illness     Valerie Baum is a 45 y.o. female who presents to the ED for generalized body aches and a headache. Patient reports it started earlier today. She has not taken anything prior to arrival.  Denies sick contacts. Complains of a dull frontal headache. No vision changes, neck pain, numbness, tingling, or weakness. Review of Systems     Review of Systems   Constitutional: Positive for fever. Negative for chills. HENT: Negative for rhinorrhea and sore throat. Eyes: Negative for discharge and redness. Respiratory: Negative for cough and shortness of breath. Cardiovascular: Negative for chest pain and leg swelling. Gastrointestinal: Positive for nausea (resolved). Negative for abdominal pain and vomiting. Genitourinary: Negative for dysuria and hematuria. Musculoskeletal: Positive for myalgias. Negative for arthralgias. Skin: Negative for color change and rash. Neurological: Positive for headaches. Negative for light-headedness. Psychiatric/Behavioral: Negative for agitation and confusion. Past Medical, Surgical, Family, and Social History     She has a past medical history of Ankle injury, Depression, and Potential exposure to STD. She has a past surgical history that includes Abdomen surgery; fracture surgery; and knee surgery. Her family history is not on file. She reports that she has never smoked. She has never used smokeless tobacco. She reports that she does not drink alcohol or use drugs.     Medications     Discharge Medication List as of 10/3/2020  5:04 PM      CONTINUE these medications which have NOT CHANGED    Details   ibuprofen (ADVIL;MOTRIN) 600 MG tablet Take 1 tablet by mouth every 6 hours as needed for Pain, Disp-30 tablet,R-0Print Allergies     She has No Known Allergies. Physical Exam     INITIAL VITALS: BP: 121/70, Temp: 101.3 °F (38.5 °C), Pulse: 85, Resp: 18, SpO2: 97 %   Physical Exam  Constitutional:       Appearance: Normal appearance. HENT:      Head: Normocephalic and atraumatic. Right Ear: External ear normal.      Left Ear: External ear normal.   Eyes:      Extraocular Movements: Extraocular movements intact. Pupils: Pupils are equal, round, and reactive to light. Neck:      Musculoskeletal: Normal range of motion and neck supple. Cardiovascular:      Rate and Rhythm: Normal rate and regular rhythm. Pulmonary:      Effort: Pulmonary effort is normal. No respiratory distress. Abdominal:      Palpations: Abdomen is soft. Tenderness: There is no abdominal tenderness. There is no guarding or rebound. Skin:     General: Skin is warm and dry. Neurological:      General: No focal deficit present. Mental Status: She is alert and oriented to person, place, and time. GCS: GCS eye subscore is 4. GCS verbal subscore is 5. GCS motor subscore is 6. Cranial Nerves: Cranial nerves are intact. Sensory: Sensation is intact. Motor: Motor function is intact. Gait: Gait normal.   Psychiatric:         Mood and Affect: Mood normal.         Behavior: Behavior normal.         Diagnostic Results       RADIOLOGY:  No orders to display       LABS:   No results found for this visit on 10/03/20. RECENT VITALS:  BP: 121/70,Temp: 101.3 °F (38.5 °C), Pulse: 85, Resp: 18, SpO2: 97 %       ED Course     Nursing Notes, Past Medical Hx, Past Surgical Hx, Social Hx,Allergies, and Family Hx were reviewed.     patient was given the following medications:  Orders Placed This Encounter   Medications    acetaminophen (TYLENOL) tablet 650 mg    ketorolac (TORADOL) injection 15 mg       CONSULTS:  None    MEDICAL DECISIONMAKING / ASSESSMENT / Josiah Joceline is a 45 y.o. female who presents for fever, myalgias, and headache. Patient febrile 101.3 F on arrival, nontoxic appearing and in no acute distress. Overall presentation consistent with viral syndrome. Will send COVID swab. GCS 15, no focal deficits, no meningeal signs. Do not suspect meningitis or intracranial bleed. Dose of tylenol and toradol improved headache. Counseled on supportive care and instructed to quarantine pending COVID results. ED precautions for worsening symptoms. Clinical Impression     1. Nonintractable headache, unspecified chronicity pattern, unspecified headache type    2.  Viral syndrome        Disposition     PATIENT REFERRED TO:  [facility] Emergency Department  [address] [address]  [phone]    If symptoms worsen      DISCHARGE MEDICATIONS:  Discharge Medication List as of 10/3/2020  5:04 PM          DISPOSITION Decision To Discharge 10/03/2020 05:11:02 PM       Ewelina Piedra MD  10/03/20 2032

## 2020-10-03 NOTE — ED NOTES
Bed: A01-01  Expected date:   Expected time:   Means of arrival:   Comments:  Anibal diaz [employer], RN  10/03/20 1520

## 2020-10-05 ENCOUNTER — CARE COORDINATION (OUTPATIENT)
Dept: CARE COORDINATION | Age: 50
End: 2020-10-05

## 2020-10-05 LAB — SARS-COV-2, PCR: NOT DETECTED

## 2020-10-21 ENCOUNTER — CARE COORDINATION (OUTPATIENT)
Dept: CARE COORDINATION | Age: 50
End: 2020-10-21

## 2020-10-21 NOTE — CARE COORDINATION
You Patient resolved from the Care Transitions episode on 10/3/2020  Discussed COVID-19 related testing which was available at this time. Test results were negative. Patient informed of results, if available? Yes    Patient/family has been provided the following resources and education related to COVID-19:                         Signs, symptoms and red flags related to COVID-19            CDC exposure and quarantine guidelines            [facility]it exposure contact - [phone]            Contact for their local Department of Health                 Patient currently reports that the following symptoms have improved:  Head ache and Generalized Body Aches Patient says she is doing well. No further outreach scheduled with this CTN/ACM. Episode of Care resolved. Patient has this CTN/ACM contact information if future needs arise.

## 2021-04-16 ENCOUNTER — HOSPITAL ENCOUNTER (EMERGENCY)
Age: 51
Discharge: HOME | End: 2021-04-16
Payer: MEDICARE

## 2021-04-16 VITALS
RESPIRATION RATE: 16 BRPM | TEMPERATURE: 97.5 F | SYSTOLIC BLOOD PRESSURE: 108 MMHG | OXYGEN SATURATION: 99 % | HEART RATE: 65 BPM | DIASTOLIC BLOOD PRESSURE: 68 MMHG

## 2021-04-16 DIAGNOSIS — M25.562 LEFT KNEE PAIN, UNSPECIFIED CHRONICITY: Primary | ICD-10-CM

## 2021-04-16 PROCEDURE — 99283 EMERGENCY DEPT VISIT LOW MDM: CPT

## 2021-04-16 PROCEDURE — 6370000000 HC RX 637 (ALT 250 FOR IP): Performed by: NURSE PRACTITIONER

## 2021-04-16 RX ORDER — NAPROXEN 500 MG/1
500 TABLET ORAL 2 TIMES DAILY PRN
Qty: 60 TABLET | Refills: 0 | Status: SHIPPED | OUTPATIENT
Start: 2021-04-16

## 2021-04-16 RX ORDER — NAPROXEN 500 MG/1
500 TABLET ORAL ONCE
Status: COMPLETED | OUTPATIENT
Start: 2021-04-16 | End: 2021-04-16

## 2021-04-16 RX ADMIN — Medication 500 MG: at 22:57

## 2021-04-16 NOTE — LETTER
[facility] Emergency Department  [place] [address]  Phone: [phone]  Fax: [phone]             April 16, 2021    Patient: Corine Santos   YOB: 1970   Date of Visit: 4/16/2021       To Whom It May Concern:    Latosha West was seen and treated in our emergency department on 4/16/2021.  She may return to work on 04/17/2021      Sincerely,             Signature:__________________________________ No

## 2021-04-17 NOTE — ED PROVIDER NOTES
[address] [facility] [address] ENCOUNTER          NURSE PRACTITIONER NOTE       Date of evaluation: 4/16/2021    Chief Complaint     Knee Pain (pt states she went to an ED, [facility], and they told her she had water on both of her knees. states she has an appointment with a knee doctor on May 1st but she dosent know who or where. states she cant wait and she needs pain medication.)      History of Present Illness     Bonnie Durand is a 45 y.o. female who presents to the emergency department with a complaint of ongoing left knee pain. Patient states that she has been told that she has fluid on the knee that needs to be drained. Evidently was seen at [address] [facility] in the past, cannot tell me when this was, and is unsure if she had x-rays done at that time. She states she has a follow-up appointment in May with an orthopedic doctor however \"the pain is so bad I need something done now. \" Patient denies any injury or trauma to her knee. It appears that the patient was seen on 3/26/2021 at [facility]; please see x-ray noted below. X-ray Knee Left 1 or 2-views3/26/2021   Health  Result Impression   IMPRESSION:    Progressed moderate left knee tricompartmental arthrosis with chondrocalcinosis, may be secondary to osteoarthritis or CPPD arthropathy. Small joint effusion. Review of Systems     Review of Systems   Constitutional: Negative. Musculoskeletal: Positive for arthralgias (Left knee). Skin: Negative. Allergic/Immunologic: Negative for immunocompromised state. Neurological: Negative for numbness. Hematological: Does not bruise/bleed easily. Psychiatric/Behavioral: Negative. Past Medical, Surgical, Family, and Social History     She has a past medical history of Ankle injury, Depression, and Potential exposure to STD. She has a past surgical history that includes Abdomen surgery; fracture surgery; and knee surgery. Her family history is not on file.   She reports that she has never smoked. She has never used smokeless tobacco. She reports that she does not drink alcohol or use drugs. Medications     Discharge Medication List as of 4/16/2021 11:00 PM          Allergies     She has No Known Allergies. Physical Exam     INITIAL VITALS: BP: 108/68, Temp: 97.5 °F (36.4 °C), Pulse: 65, Resp: 16, SpO2: 99 %   Physical Exam  Vitals signs and nursing note reviewed. Constitutional:       Appearance: Normal appearance. Cardiovascular:      Rate and Rhythm: Normal rate. Pulmonary:      Effort: Pulmonary effort is normal. No respiratory distress. Musculoskeletal:         General: Swelling and tenderness present. Comments: TTP of the left anterior knee with mild edema noted, full ROM, no ligamentous laxity noted. No erythema or warmth. Intact distal pulses and intact sensation. Skin:     General: Skin is warm and dry. Findings: No erythema. Neurological:      Mental Status: She is alert and oriented to person, place, and time. Psychiatric:         Mood and Affect: Mood normal.         Behavior: Behavior normal.           Diagnostic Results       RADIOLOGY:  No orders to display       LABS:   No results found for this visit on 04/16/21. RECENT VITALS:  BP: 108/68, Temp: 97.5 °F (36.4 °C), Pulse: 65, Resp: 16, SpO2: 99 %         ED Course     Nursing Notes, Past Medical Hx, Past Surgical Hx, Social Hx, Allergies, and Family Hx were reviewed. The patient was given the following medications:  Orders Placed This Encounter   Medications    naproxen (NAPROSYN) tablet 500 mg    naproxen (NAPROSYN) 500 MG tablet     Sig: Take 1 tablet by mouth 2 times daily as needed for Pain     Dispense:  60 tablet     Refill:  0            CONSULTS:  None    MEDICAL DECISION MAKING / ASSESSMENT / Lisa Carolin is a 45 y.o. female who presents with complaints as noted in HPI.       Patient presents to the emergency department with a complaint of ongoing left knee pain. No new injury or trauma. On evaluation she does have tenderness to the anterior knee without erythema warmth, able to range without difficulty. No concerns for septic joint or septic arthritis at this time. Patient did have x-rays done recently at an outside facility and these were noted to show arthritic changes with a small joint effusion at that time. Patient will be started on naproxen as needed for pain, as well as given instructions on intermittent ice of the knee, and will follow up with her orthopedic appointment in May. Clinical Impression     1. Left knee pain, unspecified chronicity        Disposition     PATIENT REFERRED TO:  No follow-up provider specified.     DISCHARGE MEDICATIONS:  Discharge Medication List as of 4/16/2021 11:00 PM      START taking these medications    Details   naproxen (NAPROSYN) 500 MG tablet Take 1 tablet by mouth 2 times daily as needed for Pain, Disp-60 tablet, R-0Print             DISPOSITION Decision To Discharge 04/16/2021 10:56:38 PM        JOSEPH Jean-Baptiste CNP  04/17/21 0023       JOSEPH Jean-Baptiste CNP  05/01/21 1450

## 2022-11-23 PROCEDURE — 99283 EMERGENCY DEPT VISIT LOW MDM: CPT

## 2022-11-24 ENCOUNTER — HOSPITAL ENCOUNTER (EMERGENCY)
Age: 52
Discharge: HOME | End: 2022-11-24
Attending: EMERGENCY MEDICINE
Payer: MEDICAID

## 2022-11-24 VITALS
WEIGHT: 135 LBS | DIASTOLIC BLOOD PRESSURE: 74 MMHG | OXYGEN SATURATION: 97 % | HEART RATE: 62 BPM | RESPIRATION RATE: 16 BRPM | BODY MASS INDEX: 23.92 KG/M2 | HEIGHT: 63 IN | TEMPERATURE: 98.1 F | SYSTOLIC BLOOD PRESSURE: 123 MMHG

## 2022-11-24 DIAGNOSIS — K12.0 APHTHOUS ULCER: Primary | ICD-10-CM

## 2022-11-24 DIAGNOSIS — B00.2 ORAL HERPES: ICD-10-CM

## 2022-11-24 RX ORDER — VALACYCLOVIR HYDROCHLORIDE 1 G/1
1000 TABLET, FILM COATED ORAL 2 TIMES DAILY
Qty: 14 TABLET | Refills: 0 | Status: SHIPPED | OUTPATIENT
Start: 2022-11-24 | End: 2022-12-01

## 2022-11-24 ASSESSMENT — PAIN SCALES - GENERAL: PAINLEVEL_OUTOF10: 10

## 2022-11-24 ASSESSMENT — PAIN DESCRIPTION - LOCATION: LOCATION: MOUTH

## 2022-11-24 NOTE — ED NOTES
Pt discharged to home, alert and oriented. Denies any questions about discharge instructions. Will follow up as directed. encouraged to return for any worsening symptoms.         Cecy Pond RN  11/24/22 5224
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
## 2022-12-01 ENCOUNTER — HOSPITAL ENCOUNTER (EMERGENCY)
Age: 52
Discharge: HOME | End: 2022-12-01
Attending: EMERGENCY MEDICINE
Payer: MEDICAID

## 2022-12-01 VITALS
HEIGHT: 63 IN | HEART RATE: 63 BPM | RESPIRATION RATE: 19 BRPM | OXYGEN SATURATION: 100 % | BODY MASS INDEX: 23.04 KG/M2 | TEMPERATURE: 98.5 F | WEIGHT: 130 LBS | SYSTOLIC BLOOD PRESSURE: 128 MMHG | DIASTOLIC BLOOD PRESSURE: 76 MMHG

## 2022-12-01 DIAGNOSIS — N76.0 BACTERIAL VAGINOSIS: Primary | ICD-10-CM

## 2022-12-01 DIAGNOSIS — B96.89 BACTERIAL VAGINOSIS: Primary | ICD-10-CM

## 2022-12-01 LAB
BACTERIA WET PREP: ABNORMAL
C TRACH DNA GENITAL QL NAA+PROBE: NEGATIVE
CLUE CELLS: ABNORMAL
EPITHELIAL CELLS WET PREP: ABNORMAL
N. GONORRHOEAE DNA: POSITIVE
RBC WET PREP: ABNORMAL
SOURCE WET PREP: ABNORMAL
TRICHOMONAS PREP: ABNORMAL
WBC WET PREP: ABNORMAL
YEAST WET PREP: ABNORMAL

## 2022-12-01 PROCEDURE — 99284 EMERGENCY DEPT VISIT MOD MDM: CPT

## 2022-12-01 PROCEDURE — 87591 N.GONORRHOEAE DNA AMP PROB: CPT

## 2022-12-01 PROCEDURE — 6370000000 HC RX 637 (ALT 250 FOR IP): Performed by: STUDENT IN AN ORGANIZED HEALTH CARE EDUCATION/TRAINING PROGRAM

## 2022-12-01 PROCEDURE — 87491 CHLMYD TRACH DNA AMP PROBE: CPT

## 2022-12-01 PROCEDURE — 6360000002 HC RX W HCPCS: Performed by: STUDENT IN AN ORGANIZED HEALTH CARE EDUCATION/TRAINING PROGRAM

## 2022-12-01 PROCEDURE — 87210 SMEAR WET MOUNT SALINE/INK: CPT

## 2022-12-01 PROCEDURE — 96372 THER/PROPH/DIAG INJ SC/IM: CPT

## 2022-12-01 RX ORDER — CEFTRIAXONE 500 MG/1
500 INJECTION, POWDER, FOR SOLUTION INTRAMUSCULAR; INTRAVENOUS ONCE
Status: COMPLETED | OUTPATIENT
Start: 2022-12-01 | End: 2022-12-01

## 2022-12-01 RX ORDER — AZITHROMYCIN 250 MG/1
1000 TABLET, FILM COATED ORAL ONCE
Status: COMPLETED | OUTPATIENT
Start: 2022-12-01 | End: 2022-12-01

## 2022-12-01 RX ORDER — METRONIDAZOLE 500 MG/1
500 TABLET ORAL ONCE
Status: COMPLETED | OUTPATIENT
Start: 2022-12-01 | End: 2022-12-01

## 2022-12-01 RX ORDER — METRONIDAZOLE 500 MG/1
500 TABLET ORAL 2 TIMES DAILY
Qty: 14 TABLET | Refills: 0 | Status: SHIPPED | OUTPATIENT
Start: 2022-12-01 | End: 2022-12-08

## 2022-12-01 RX ADMIN — CEFTRIAXONE 500 MG: 500 INJECTION, POWDER, FOR SOLUTION INTRAMUSCULAR; INTRAVENOUS at 04:35

## 2022-12-01 RX ADMIN — AZITHROMYCIN 1000 MG: 250 TABLET, FILM COATED ORAL at 04:35

## 2022-12-01 RX ADMIN — METRONIDAZOLE 500 MG: 500 TABLET ORAL at 04:34

## 2022-12-01 ASSESSMENT — PAIN - FUNCTIONAL ASSESSMENT: PAIN_FUNCTIONAL_ASSESSMENT: NONE - DENIES PAIN

## 2022-12-01 NOTE — ED PROVIDER NOTES
ED Attending Attestation Note     Date of evaluation: 12/1/2022    This patient was seen by the resident. I have seen and examined the patient, agree with the workup, evaluation, management and diagnosis. The care plan has been discussed. My assessment reveals a 29-year-old female presenting with vaginal discharge after recent sexual activity. She is awake and alert no acute distress. She has no abdominal tenderness or flank tenderness.   Please see resident note for details of her  exam..        Sajan Thompson MD  12/01/22 1538
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
